# Patient Record
Sex: MALE | Race: WHITE | NOT HISPANIC OR LATINO | Employment: FULL TIME | URBAN - NONMETROPOLITAN AREA
[De-identification: names, ages, dates, MRNs, and addresses within clinical notes are randomized per-mention and may not be internally consistent; named-entity substitution may affect disease eponyms.]

---

## 2023-03-13 PROBLEM — L60.0 INGROWN LEFT BIG TOENAIL: Status: ACTIVE | Noted: 2023-03-13

## 2023-03-13 PROBLEM — J31.0 CHRONIC RHINITIS: Status: ACTIVE | Noted: 2023-03-13

## 2023-03-13 PROBLEM — M79.672 LEFT FOOT PAIN: Status: ACTIVE | Noted: 2023-03-13

## 2023-03-14 ENCOUNTER — OFFICE VISIT (OUTPATIENT)
Dept: PRIMARY CARE | Facility: CLINIC | Age: 33
End: 2023-03-14
Payer: COMMERCIAL

## 2023-03-14 VITALS
WEIGHT: 278 LBS | TEMPERATURE: 96.8 F | HEART RATE: 105 BPM | SYSTOLIC BLOOD PRESSURE: 130 MMHG | DIASTOLIC BLOOD PRESSURE: 80 MMHG | OXYGEN SATURATION: 97 % | BODY MASS INDEX: 39.33 KG/M2

## 2023-03-14 DIAGNOSIS — J18.9 COMMUNITY ACQUIRED PNEUMONIA OF LEFT LOWER LOBE OF LUNG: Primary | ICD-10-CM

## 2023-03-14 PROCEDURE — 99213 OFFICE O/P EST LOW 20 MIN: CPT

## 2023-03-14 RX ORDER — AMOXICILLIN AND CLAVULANATE POTASSIUM 875; 125 MG/1; MG/1
875 TABLET, FILM COATED ORAL 2 TIMES DAILY
Qty: 20 TABLET | Refills: 0 | Status: SHIPPED | OUTPATIENT
Start: 2023-03-14 | End: 2023-03-24

## 2023-03-14 RX ORDER — GUAIFENESIN AND DEXTROMETHORPHAN HYDROBROMIDE 1200; 60 MG/1; MG/1
1 TABLET, EXTENDED RELEASE ORAL EVERY 12 HOURS PRN
Qty: 20 TABLET | Refills: 0 | Status: SHIPPED | OUTPATIENT
Start: 2023-03-14 | End: 2023-03-24

## 2023-03-14 RX ORDER — FLUTICASONE PROPIONATE 50 MCG
2 SPRAY, SUSPENSION (ML) NASAL 2 TIMES DAILY
Qty: 15.8 ML | Refills: 0 | Status: SHIPPED | OUTPATIENT
Start: 2023-03-14

## 2023-03-14 ASSESSMENT — ENCOUNTER SYMPTOMS
SORE THROAT: 1
HEADACHES: 1
SHORTNESS OF BREATH: 0
STRIDOR: 1
ABDOMINAL PAIN: 0
NECK PAIN: 0
SWOLLEN GLANDS: 1
TROUBLE SWALLOWING: 0
COUGH: 1
VOMITING: 0
DIARRHEA: 0
HOARSE VOICE: 1

## 2023-03-14 NOTE — PROGRESS NOTES
Subjective   Patient ID: Sarmad Hernandez is a 32 y.o. male who presents for Cough (Sarmad is being seen for a cough that started a few days ago. ), Sore Throat (Has had a sore throat with swollen glands. ), and Nasal Congestion (Has also had nasal drainage. ).  Cough for the past 3 days  Chest congestion, nasal drainage, headache, loss of appetite, no fevers  Sore throat, no swallow issues  Productive cough.     No tooth pain, no jaw pain  Slight redness in throat  No shortness of breath    Sore Throat   This is a new problem. The current episode started in the past 7 days. The problem has been gradually worsening. Neither side of throat is experiencing more pain than the other. There has been no fever. The fever has been present for Less than 1 day. The pain is at a severity of 4/10. Associated symptoms include congestion, coughing, ear pain, headaches, a hoarse voice, a plugged ear sensation, stridor and swollen glands. Pertinent negatives include no abdominal pain, diarrhea, drooling, ear discharge, neck pain, shortness of breath, trouble swallowing or vomiting.       Vitals:    03/14/23 0939   BP: 130/80   Pulse: 105   Temp: 36 °C (96.8 °F)   SpO2: 97%       Review of Systems   HENT:  Positive for congestion, ear pain, hoarse voice and sore throat. Negative for drooling, ear discharge and trouble swallowing.    Respiratory:  Positive for cough and stridor. Negative for shortness of breath.    Gastrointestinal:  Negative for abdominal pain, diarrhea and vomiting.   Musculoskeletal:  Negative for neck pain.   Neurological:  Positive for headaches.   All other systems reviewed and are negative.      Objective   Physical Exam  Vitals reviewed.   Constitutional:       General: He is not in acute distress.     Appearance: Normal appearance. He is normal weight. He is not toxic-appearing.   HENT:      Head: Normocephalic.      Nose: Rhinorrhea present.      Mouth/Throat:      Mouth: Mucous membranes are moist.       Pharynx: Posterior oropharyngeal erythema present.   Cardiovascular:      Rate and Rhythm: Normal rate and regular rhythm.      Pulses: Normal pulses.   Pulmonary:      Effort: Pulmonary effort is normal.      Breath sounds: Decreased air movement present. Examination of the left-lower field reveals decreased breath sounds and rhonchi. Decreased breath sounds and rhonchi present.   Musculoskeletal:         General: Normal range of motion.   Neurological:      Mental Status: He is alert.         Assessment/Plan   Problem List Items Addressed This Visit          Respiratory    Community acquired pneumonia of left lower lobe of lung - Primary    Relevant Medications    dextromethorphan-guaifenesin (Mucinex DM) 60-1,200 mg tablet extended release 12 hr    amoxicillin-pot clavulanate (Augmentin) 875-125 mg tablet    fluticasone (Flonase Allergy Relief) 50 mcg/actuation nasal spray    Other Relevant Orders    XR chest 2 views            Thank you for coming in today, please call my office if you have any concerns or questions.     Jacques CARSON, CNP

## 2023-03-15 ENCOUNTER — TELEPHONE (OUTPATIENT)
Dept: PRIMARY CARE | Facility: CLINIC | Age: 33
End: 2023-03-15
Payer: COMMERCIAL

## 2023-03-15 NOTE — RESULT ENCOUNTER NOTE
Please let Sarmad know that his Chest XR is negative, he and Regina can continue the antibiotics as prescribed. Thanks

## 2023-03-15 NOTE — TELEPHONE ENCOUNTER
Spoke with Antoni, he verbalized understanding of xray results. Will start antibiotics and will call in a few days if he is not feeling better.

## 2023-03-15 NOTE — TELEPHONE ENCOUNTER
----- Message from YAMILETH Mcmillan-CNP sent at 3/15/2023  8:00 AM EDT -----  Please let Sarmad know that his Chest XR is negative, he and Regina can continue the antibiotics as prescribed. Thanks

## 2024-01-17 ENCOUNTER — OFFICE VISIT (OUTPATIENT)
Dept: PRIMARY CARE | Facility: CLINIC | Age: 34
End: 2024-01-17
Payer: COMMERCIAL

## 2024-01-17 VITALS
OXYGEN SATURATION: 98 % | SYSTOLIC BLOOD PRESSURE: 130 MMHG | WEIGHT: 271 LBS | DIASTOLIC BLOOD PRESSURE: 80 MMHG | TEMPERATURE: 97.1 F | HEART RATE: 102 BPM | BODY MASS INDEX: 38.33 KG/M2

## 2024-01-17 DIAGNOSIS — S81.812A LACERATION OF LEFT LOWER EXTREMITY, INITIAL ENCOUNTER: Primary | ICD-10-CM

## 2024-01-17 PROCEDURE — 1036F TOBACCO NON-USER: CPT

## 2024-01-17 PROCEDURE — 99214 OFFICE O/P EST MOD 30 MIN: CPT

## 2024-01-17 RX ORDER — SULFAMETHOXAZOLE AND TRIMETHOPRIM 800; 160 MG/1; MG/1
1 TABLET ORAL 2 TIMES DAILY
Qty: 14 TABLET | Refills: 0 | Status: SHIPPED | OUTPATIENT
Start: 2024-01-17 | End: 2024-01-24

## 2024-01-17 NOTE — PROGRESS NOTES
Subjective   Patient ID: Sarmad Hernandez is a 33 y.o. male who presents for Leg Injury (Sarmad is here for a leg wound on  left leg. Has been there for about 5 days. Started to feel warm to touch and is a little swollen. Already got his t dap injections. ).  Subjective  Sarmad Hernandez is a 33 y.o. male who presents for evaluation of a laceration to the lower extremity. Injury occurred a few days ago. The mechanism of the wound was metal edge. The patient reports no coldness, pain in lower extremity in the affected area. There were no other injuries. Patient denies loss of consciousness, numbness, and weakness. The tetanus status is up to date done yesterday by [facility] dept.    Objective  There is a linear laceration measuring approximately 8 cm in length on the anterior lower extremity. Examination of the wound for foreign bodies and devitalized tissue showed none. Examination of the surrounding area for neural or vascular damage showed none     Assessment/Plan  8 cm lower extremity laceration.    Wound care discussed. Tetanus up to date  Systemic antibiotics for 7 days.          Vitals:    01/17/24 1105   BP: 130/80   Pulse: 102   Temp: 36.2 °C (97.1 °F)   SpO2: 98%       Review of Systems    Objective   Physical Exam    Assessment/Plan   Problem List Items Addressed This Visit    None  Visit Diagnoses       Laceration of left lower extremity, initial encounter    -  Primary    Relevant Medications    sulfamethoxazole-trimethoprim (Bactrim DS) 800-160 mg tablet                 Thank you for coming in today, please call my office if you have any concerns or questions.     Jacques CARSON, CNP

## 2024-01-17 NOTE — PATIENT INSTRUCTIONS
Wound bed looks good, start Bactrim as prevention  Continue triple antibiotic ointment    Thank you for coming in today, if any questions or concerns arise, please call my office.   Jacques Allen, YAMILETH-CNP

## 2024-10-14 ENCOUNTER — OFFICE VISIT (OUTPATIENT)
Dept: PRIMARY CARE | Facility: CLINIC | Age: 34
End: 2024-10-14
Payer: COMMERCIAL

## 2024-10-14 VITALS
TEMPERATURE: 98.3 F | BODY MASS INDEX: 40.88 KG/M2 | HEART RATE: 118 BPM | DIASTOLIC BLOOD PRESSURE: 90 MMHG | WEIGHT: 289 LBS | SYSTOLIC BLOOD PRESSURE: 124 MMHG | OXYGEN SATURATION: 97 %

## 2024-10-14 DIAGNOSIS — J40 BRONCHITIS: Primary | ICD-10-CM

## 2024-10-14 PROCEDURE — 1036F TOBACCO NON-USER: CPT | Performed by: FAMILY MEDICINE

## 2024-10-14 PROCEDURE — 99214 OFFICE O/P EST MOD 30 MIN: CPT | Performed by: FAMILY MEDICINE

## 2024-10-14 RX ORDER — BENZONATATE 200 MG/1
200 CAPSULE ORAL 3 TIMES DAILY
Qty: 42 CAPSULE | Refills: 0 | Status: SHIPPED | OUTPATIENT
Start: 2024-10-14 | End: 2024-11-13

## 2024-10-14 RX ORDER — METHYLPREDNISOLONE 4 MG/1
TABLET ORAL
Qty: 21 TABLET | Refills: 0 | Status: SHIPPED | OUTPATIENT
Start: 2024-10-14 | End: 2024-10-21

## 2024-10-14 ASSESSMENT — ENCOUNTER SYMPTOMS
HEMATURIA: 0
UNEXPECTED WEIGHT CHANGE: 0
DIZZINESS: 0
WEAKNESS: 0
VOMITING: 0
BLOOD IN STOOL: 0
APPETITE CHANGE: 0
NERVOUS/ANXIOUS: 0
HEADACHES: 0
SORE THROAT: 0
JOINT SWELLING: 0
ACTIVITY CHANGE: 0
SINUS PRESSURE: 0
ABDOMINAL PAIN: 0
DYSPHORIC MOOD: 0
MYALGIAS: 0
CONSTIPATION: 0
SHORTNESS OF BREATH: 0
COUGH: 1
EYE DISCHARGE: 0
NUMBNESS: 0
ARTHRALGIAS: 0
SLEEP DISTURBANCE: 0
FEVER: 0
RHINORRHEA: 0
DYSURIA: 0
PALPITATIONS: 0
FLANK PAIN: 0
DIARRHEA: 0
LIGHT-HEADEDNESS: 0
NAUSEA: 0
WHEEZING: 0
EYE ITCHING: 0

## 2024-10-14 NOTE — LETTER
October 14, 2024     Patient: Sarmad Hernandez   YOB: 1990   Date of Visit: 10/14/2024       To Whom It May Concern:    Sarmad Hernandez was seen in my clinic on 10/14/2024 at 9:45 am. Please excuse Sarmad for his absence from work on this day to make the appointment.  Sarmad may return to work tomorrow 10/15/24.    If you have any questions or concerns, please don't hesitate to call.         Sincerely,         Mel Deng, DO        CC: No Recipients

## 2024-10-14 NOTE — PROGRESS NOTES
Subjective   Patient ID: Sarmad Hernandez is a 33 y.o. male who presents for Cough (Was dry for about 1 week, now with more production.).    HPI NOT SICK A LOT WHEN ADULT   CHILD HAD COUGH AFTER A BIRTHDAY PARTY   HE CAUGHT AND THINKS IT IS ALMOST OVER      Review of Systems   Constitutional:  Negative for activity change, appetite change, fever and unexpected weight change.   HENT:  Negative for congestion, ear pain, postnasal drip, rhinorrhea, sinus pressure and sore throat.    Eyes:  Negative for discharge, itching and visual disturbance.   Respiratory:  Positive for cough. Negative for shortness of breath and wheezing.    Cardiovascular:  Negative for chest pain, palpitations and leg swelling.   Gastrointestinal:  Negative for abdominal pain, blood in stool, constipation, diarrhea, nausea and vomiting.   Endocrine: Negative for cold intolerance, heat intolerance and polyuria.   Genitourinary:  Negative for dysuria, flank pain and hematuria.   Musculoskeletal:  Negative for arthralgias, gait problem, joint swelling and myalgias.   Skin:  Negative for rash.   Allergic/Immunologic: Negative for environmental allergies and food allergies.   Neurological:  Negative for dizziness, syncope, weakness, light-headedness, numbness and headaches.   Psychiatric/Behavioral:  Negative for dysphoric mood and sleep disturbance. The patient is not nervous/anxious.        Objective   /90   Pulse (!) 118   Temp 36.8 °C (98.3 °F)   Wt 131 kg (289 lb)   SpO2 97%   BMI 40.88 kg/m²     Physical Exam  Vitals and nursing note reviewed.   Constitutional:       Appearance: Normal appearance.   HENT:      Head: Normocephalic.      Mouth/Throat:      Mouth: Mucous membranes are dry.   Cardiovascular:      Rate and Rhythm: Normal rate and regular rhythm.      Pulses: Normal pulses.      Heart sounds: Normal heart sounds. No murmur heard.     No friction rub. No gallop.   Pulmonary:      Effort: Pulmonary effort is normal. No  respiratory distress.      Breath sounds: Rhonchi present. No wheezing.   Abdominal:      General: Bowel sounds are normal. There is no distension.      Palpations: Abdomen is soft.      Tenderness: There is no abdominal tenderness.   Musculoskeletal:         General: No deformity. Normal range of motion.   Skin:     General: Skin is warm and dry.      Capillary Refill: Capillary refill takes less than 2 seconds.   Neurological:      General: No focal deficit present.      Mental Status: He is alert and oriented to person, place, and time.   Psychiatric:         Mood and Affect: Mood normal.         Assessment/Plan   Problem List Items Addressed This Visit             ICD-10-CM    Bronchitis - Primary J40    Relevant Medications    methylPREDNISolone (Medrol Dospak) 4 mg tablets    benzonatate (Tessalon) 200 mg capsule

## 2025-04-29 ENCOUNTER — OFFICE VISIT (OUTPATIENT)
Dept: URGENT CARE | Facility: URGENT CARE | Age: 35
End: 2025-04-29
Payer: COMMERCIAL

## 2025-04-29 VITALS
BODY MASS INDEX: 41.63 KG/M2 | OXYGEN SATURATION: 99 % | SYSTOLIC BLOOD PRESSURE: 139 MMHG | TEMPERATURE: 98.3 F | RESPIRATION RATE: 18 BRPM | DIASTOLIC BLOOD PRESSURE: 96 MMHG | HEART RATE: 102 BPM | WEIGHT: 294.31 LBS

## 2025-04-29 DIAGNOSIS — R50.9 FEVER, UNSPECIFIED FEVER CAUSE: ICD-10-CM

## 2025-04-29 DIAGNOSIS — J01.10 ACUTE FRONTAL SINUSITIS, RECURRENCE NOT SPECIFIED: Primary | ICD-10-CM

## 2025-04-29 DIAGNOSIS — J01.00 ACUTE MAXILLARY SINUSITIS, RECURRENCE NOT SPECIFIED: ICD-10-CM

## 2025-04-29 DIAGNOSIS — J10.1 INFLUENZA B: ICD-10-CM

## 2025-04-29 LAB
POC HUMAN RHINOVIRUS PCR: NEGATIVE
POC INFLUENZA A VIRUS PCR: NEGATIVE
POC INFLUENZA B VIRUS PCR: POSITIVE
POC RESPIRATORY SYNCYTIAL VIRUS PCR: NEGATIVE
POC STREPTOCOCCUS PYOGENES (GROUP A STREP) PCR: NEGATIVE

## 2025-04-29 PROCEDURE — 99214 OFFICE O/P EST MOD 30 MIN: CPT | Performed by: PHYSICIAN ASSISTANT

## 2025-04-29 PROCEDURE — 87651 STREP A DNA AMP PROBE: CPT | Performed by: PHYSICIAN ASSISTANT

## 2025-04-29 PROCEDURE — 1036F TOBACCO NON-USER: CPT | Performed by: PHYSICIAN ASSISTANT

## 2025-04-29 PROCEDURE — 87631 RESP VIRUS 3-5 TARGETS: CPT | Performed by: PHYSICIAN ASSISTANT

## 2025-04-29 RX ORDER — FLUTICASONE PROPIONATE 50 MCG
1 SPRAY, SUSPENSION (ML) NASAL 2 TIMES DAILY
Qty: 16 G | Refills: 0 | Status: SHIPPED | OUTPATIENT
Start: 2025-04-29 | End: 2025-05-09

## 2025-04-29 RX ORDER — AMOXICILLIN AND CLAVULANATE POTASSIUM 875; 125 MG/1; MG/1
875 TABLET, FILM COATED ORAL 2 TIMES DAILY
Qty: 20 TABLET | Refills: 0 | Status: SHIPPED | OUTPATIENT
Start: 2025-04-29

## 2025-04-29 ASSESSMENT — ENCOUNTER SYMPTOMS
FATIGUE: 1
SINUS PAIN: 1
SINUS PRESSURE: 1
HEADACHES: 1
GASTROINTESTINAL NEGATIVE: 1
COUGH: 1
CHILLS: 1
RHINORRHEA: 1
CARDIOVASCULAR NEGATIVE: 1

## 2025-04-29 NOTE — PROGRESS NOTES
Subjective   Patient ID: Sarmad Hernandez is a 34 y.o. male. They present today with a chief complaint of Illness (Pressure headaches, runny nose, fever. Symptoms began on Sunday. ).    History of Present Illness  34-year-old male here with complaints of headache runny nose  sinus pressure /pain fever (up to 103 )  that started 2 days ago eating and drinking well has taken over-the-counter  severe cough/cold medication for his symptoms no fevers here    Patient's significant other is also here with similar symptoms    The symptoms started 2 days ago but does have a history of chronic sinus issues and is on medication over-the-counter for this daily      History provided by:  Patient  Illness  Associated symptoms: congestion, cough, fatigue, headaches and rhinorrhea        Past Medical History  Allergies as of 04/29/2025    (No Known Allergies)       Prescriptions Prior to Admission[1]     Medical History[2]    Surgical History[3]     reports that he has never smoked. He has never used smokeless tobacco. He reports that he does not currently use alcohol. He reports that he does not use drugs.    Review of Systems  Review of Systems   Constitutional:  Positive for chills and fatigue.   HENT:  Positive for congestion, rhinorrhea, sinus pressure and sinus pain.    Respiratory:  Positive for cough.    Cardiovascular: Negative.    Gastrointestinal: Negative.    Genitourinary: Negative.    Neurological:  Positive for headaches.   All other systems reviewed and are negative.                                 Objective    Vitals:    04/29/25 1008   BP: (!) 139/96   BP Location: Left arm   Patient Position: Sitting   BP Cuff Size: Adult long   Pulse: 102   Resp: 18   Temp: 36.8 °C (98.3 °F)   TempSrc: Oral   SpO2: 99%   Weight: 134 kg (294 lb 5 oz)     No LMP for male patient.    Physical Exam  Vitals and nursing note reviewed.   Constitutional:       Appearance: Normal appearance.   HENT:      Head: Normocephalic and  atraumatic.      Right Ear: Tympanic membrane, ear canal and external ear normal.      Left Ear: Tympanic membrane, ear canal and external ear normal.      Nose: Congestion and rhinorrhea present.      Comments: Purulent nasal discharge maxillary frontal sinus pressure and pain     Mouth/Throat:      Mouth: Mucous membranes are moist.      Pharynx: Posterior oropharyngeal erythema present.   Eyes:      Extraocular Movements: Extraocular movements intact.      Pupils: Pupils are equal, round, and reactive to light.   Cardiovascular:      Rate and Rhythm: Regular rhythm. Tachycardia present.      Pulses: Normal pulses.      Heart sounds: Normal heart sounds.   Pulmonary:      Effort: Pulmonary effort is normal.      Breath sounds: Normal breath sounds.   Abdominal:      Palpations: Abdomen is soft.   Musculoskeletal:         General: Normal range of motion.      Cervical back: Normal range of motion and neck supple.   Lymphadenopathy:      Cervical: No cervical adenopathy.   Skin:     General: Skin is warm.      Capillary Refill: Capillary refill takes less than 2 seconds.   Neurological:      General: No focal deficit present.      Mental Status: He is alert and oriented to person, place, and time.         Procedures    Point of Care Test & Imaging Results from this visit  No results found for this visit on 04/29/25.   Imaging  No results found.    Cardiology, Vascular, and Other Imaging  No other imaging results found for the past 2 days      Diagnostic study results (if any) were reviewed by [facility].    Assessment/Plan   Allergies, medications, history, and pertinent labs/EKGs/Imaging reviewed by Sanam Grace PA-C.     Medical Decision Making  Differential:   1) viral URI   2) seasonal allergies   3 ) sinusitis  4) influenza     34-year-old male here with complaints of fevers chills body aches nasal congestion and headache/maxillary and frontal sinus pain with palp with purulent nasal discharge does  "have a history of \"chronic rhinitis seasonal allergies that he takes medication daily for\" that have been acting up over the last month but the specific symptoms  started 2 days ago , testing performed including strep and influenza and rhinovirus .  Patient positive for influenza B, advise to take the over-the-counter cough and cold medication if symptoms do not improve or get worse over the last 7 to 8 days to start the antibiotic or return for reeval    Patient tested positive for influenza B         Plan: Discussed differential with the patient and /or parents family   Patient to  follow up with the PCP in the next 2-3 days  Return for any worsening symptoms or go to the ER for further evaluation. Patient/family/caregiver  understands return   precautions and discharge instructions and is agreeable to the current plan   Impression: Influenza B ,        Orders and Diagnoses for this visit    Orders and Diagnoses  There are no diagnoses linked to this encounter.    Medical Admin Record      Patient disposition: Home    Electronically signed by [facility]  10:20 AM           [1] (Not in a hospital admission)  [2]   Past Medical History:  Diagnosis Date    Other specified health status     Known health problems: none   [3]   Past Surgical History:  Procedure Laterality Date    OTHER SURGICAL HISTORY  03/06/2019    Nail avulsion procedure     "

## 2025-04-29 NOTE — LETTER
April 29, 2025     Patient: Sarmda Hernandez   YOB: 1990   Date of Visit: 4/29/2025       To Whom It May Concern:    Sarmad Hernandez was seen in my clinic on 4/29/2025 at 10:05 am. Please excuse Sarmad for his absence work for today 4/29/2025 through Thursday May 1 2025  If you have any questions or concerns, please don't hesitate to call.         Sincerely,         Sanam Grace PA-C        CC: No Recipients

## 2025-04-29 NOTE — LETTER
April 29, 2025     Patient: Sarmad Hernandez   YOB: 1990   Date of Visit: 4/29/2025       To Whom It May Concern:    Sarmad Hernandez was seen in my clinic on 4/29/2025 at 10:05 am. Please excuse Sarmad for his absence from work on this day to make the appointment.    If you have any questions or concerns, please don't hesitate to call.         Sincerely,         Sanam Grace PA-C        CC: No Recipients